# Patient Record
Sex: MALE | URBAN - METROPOLITAN AREA
[De-identification: names, ages, dates, MRNs, and addresses within clinical notes are randomized per-mention and may not be internally consistent; named-entity substitution may affect disease eponyms.]

---

## 2017-12-29 ENCOUNTER — APPOINTMENT (OUTPATIENT)
Age: 52
Setting detail: DERMATOLOGY
End: 2017-12-29

## 2017-12-29 ENCOUNTER — APPOINTMENT (RX ONLY)
Age: 52
Setting detail: DERMATOLOGY
End: 2017-12-29

## 2017-12-29 DIAGNOSIS — Z41.9 ENCOUNTER FOR PROCEDURE FOR PURPOSES OTHER THAN REMEDYING HEALTH STATE, UNSPECIFIED: ICD-10-CM

## 2017-12-29 PROCEDURE — ? PRODUCT LINE (ANTI-AGING)

## 2017-12-29 PROCEDURE — ? COSMETIC CONSULTATION - PHOTOREJUVENATION

## 2017-12-29 ASSESSMENT — LOCATION ZONE DERM
LOCATION ZONE: FACE
LOCATION ZONE: FACE

## 2017-12-29 ASSESSMENT — LOCATION DETAILED DESCRIPTION DERM
LOCATION DETAILED: LEFT CENTRAL MALAR CHEEK
LOCATION DETAILED: LEFT CENTRAL MALAR CHEEK

## 2017-12-29 ASSESSMENT — LOCATION SIMPLE DESCRIPTION DERM
LOCATION SIMPLE: LEFT CHEEK
LOCATION SIMPLE: LEFT CHEEK

## 2017-12-29 NOTE — PROCEDURE: PRODUCT LINE (ANTI-AGING)
Product 30 Application Directions: Apply a pump 2 x week at bedtime following Daily Power Defense
Name Of Product 10: Vitascrub
Product 2 Units: 0
Product 19 Application Directions: Apply to entire face BID
Product 10 Application Directions: Scrub entire face QD in the shower
Product 4 Application Directions: Exfoliate 3 x week in mornings
Product 36 Application Directions: Twice a day post Daily Power Defense
Product 13 Price (In Dollars - Numeric Only, No Special Characters Or $): 299
Name Of Product 26: RetLinksyx
Product 12 Price (In Dollars - Numeric Only, No Special Characters Or $): 49
Name Of Product 30: Upplication Travel Size
Render Product Pricing In Note: Yes
Name Of Product 22: Normalizing System
Name Of Product 15: Antiaging level 3 kit
Name Of Product 17: C Bright
Name Of Product 11: Cebatrol pads
Product 8 Application Directions: Apply QD as directed.
Product 36 Units: 1
Product 17 Price (In Dollars - Numeric Only, No Special Characters Or $): 93
Product 1 Price (In Dollars - Numeric Only, No Special Characters Or $): 120
Name Of Product 13: ZO SKIN HEALTH AGGRESSIVE ANTI-AGING LEVEL III
Product 34 Price (In Dollars - Numeric Only, No Special Characters Or $): 41.54
Product 15 Price (In Dollars - Numeric Only, No Special Characters Or $): 318.43
Product 30 Price (In Dollars - Numeric Only, No Special Characters Or $): 56.00
Product 28 Application Directions: Apply to entire face after cleansing QD
Product 24 Application Directions: Cleanse face and neck BID
Product 28 Price (In Dollars - Numeric Only, No Special Characters Or $): 51
Product 33 Application Directions: Apply to entire face QAM
Product 18 Application Directions: Apply to face and neck after washing BID
Product 24 Price (In Dollars - Numeric Only, No Special Characters Or $): 158.43
Name Of Product 7: Melamin C
Product 33 Price (In Dollars - Numeric Only, No Special Characters Or $): 93.00
Product 12 Application Directions: Cleanse face BID
Product 19 Price (In Dollars - Numeric Only, No Special Characters Or $): 93.72
Name Of Product 19: Rozatrol
Product 23 Application Directions: Apply to entire face as directed
Name Of Product 31: ZO Skin Health Level I
Product 31 Price (In Dollars - Numeric Only, No Special Characters Or $): 160
Product 21 Application Directions: Apply QAM
Product 25 Price (In Dollars - Numeric Only, No Special Characters Or $): 65
Product 35 Application Directions: Scrub face QAM or as tolerated
Product 11 Price (In Dollars - Numeric Only, No Special Characters Or $): 66.00
Product 31 Application Directions: Apply to entire face BID as directed.
Name Of Product 14: Hydrafirm
Product 11 Application Directions: Swab to entire face BID after cleansing.
Product 26 Price (In Dollars - Numeric Only, No Special Characters Or $): 154.42
Product 7 Application Directions: BID
Product 22 Price (In Dollars - Numeric Only, No Special Characters Or $): 215
Product 7 Price (In Dollars - Numeric Only, No Special Characters Or $): 125
Name Of Product 6: Daily power defense
Name Of Product 24: Clarisonic Dinora 2
Name Of Product 8: ZO Level II Antiaging kit
Name Of Product 3: Growth Factor Serum
Product 32 Application Directions: Apply to dark circles QHS
Name Of Product 9: Carissa
Product 6 Price (In Dollars - Numeric Only, No Special Characters Or $): 150
Product 8 Price (In Dollars - Numeric Only, No Special Characters Or $): 249
Product 2 Price (In Dollars - Numeric Only, No Special Characters Or $): 130
Product 27 Price (In Dollars - Numeric Only, No Special Characters Or $): 74.55
Product 10 Price (In Dollars - Numeric Only, No Special Characters Or $): 80
Name Of Product 21: Ossentials SPF Primer
Product 20 Application Directions: Apply to entire face QHS PRN
Product 14 Application Directions: Apply to periorbital region 3 x week at night
Product 14 Price (In Dollars - Numeric Only, No Special Characters Or $): 140
Name Of Product 16: Brightenex 0.5%
Name Of Product 28: TE PADS
Product 17 Application Directions: Apply QAM after cleansing
Name Of Product 27: Glycogent
Product 32 Price (In Dollars - Numeric Only, No Special Characters Or $): 0.00
Name Of Product 1: Brightenex 1%
Product 16 Price (In Dollars - Numeric Only, No Special Characters Or $): 104
Product 3 Price (In Dollars - Numeric Only, No Special Characters Or $): 148
Name Of Product 12: Cecy
Product 3 Application Directions: Apply at Bedtime
Detail Level: Zone
Product 9 Application Directions: Cleanse entire face BID, free sample given
Product 22 Application Directions: Apply to face as directed on packaging
Product 5 Application Directions: Apply QAM before makeup
Name Of Product 18: Evette
Name Of Product 25: Ossentials SPF 30 Primer
Name Of Product 4: Exfoliating polish
Name Of Product 5: Smartone SPF 50
Product 16 Application Directions: Apply to entire face QHS
Name Of Product 37: Smartone 50 SPF
Product 36 Price (In Dollars - Numeric Only, No Special Characters Or $): 88
Name Of Product 2: Ossentials Eye repair
Name Of Product 35: Vitascrub travel size
Product 2 Application Directions: Apply to periorbital region BID
Product 35 Price (In Dollars - Numeric Only, No Special Characters Or $): 50.05
Name Of Product 34: Exfoliating cleanser
Product 20 Price (In Dollars - Numeric Only, No Special Characters Or $): 145
Product 4 Price (In Dollars - Numeric Only, No Special Characters Or $): 67
Product 1 Application Directions: Apply to entire face every two nights.
Product 18 Price (In Dollars - Numeric Only, No Special Characters Or $): 39.40
Product 34 Application Directions: Wash face BID
Name Of Product 29: CytomX [employer]rapeutics SPF Travel size
Product 40 Application Directions: Apply to entire face right after cleansing and toning
Name Of Product 38: Advanced Radical Night Repair
Product 39 Application Directions: Qam
Name Of Product 49: Exfoliating cleanser travel size
Name Of Product 39: Colorescience Tan Brush SPF 50
Product 41 Price (In Dollars - Numeric Only, No Special Characters Or $): 103
Name Of Product 46: Brightenex 1% travel size
Product 49 Application Directions: Cleanse face every morning and every night
Name Of Product 40: Instant pore refiner
Product 39 Price (In Dollars - Numeric Only, No Special Characters Or $): 64
Product 42 Price (In Dollars - Numeric Only, No Special Characters Or $): 138.45
Product 41 Application Directions: Apply to face and neck once a day
Name Of Product 41: Ommerse Renewal cream
Name Of Product 47: Exfoliating [place] Travel size
Name Of Product 50: Skin care kit Level I
Product 45 Price (In Dollars - Numeric Only, No Special Characters Or $): 45
Product 43 Application Directions: Apply to entire face twice daily followed by C Mahesh
Name Of Product 43: Rubenve
Product 46 Price (In Dollars - Numeric Only, No Special Characters Or $): 56
Product 44 Price (In Dollars - Numeric Only, No Special Characters Or $): 39
Product 38 Price (In Dollars - Numeric Only, No Special Characters Or $): 170
Name Of Product 45: Sulfa Mask
Product 37 Price (In Dollars - Numeric Only, No Special Characters Or $): 74.9
Name Of Product 44: Colorscience Holiday Kit
Product 40 Price (In Dollars - Numeric Only, No Special Characters Or $): 60
Product 44 Application Directions: As directed
Product 47 Price (In Dollars - Numeric Only, No Special Characters Or $): 18
Product 38 Application Directions: Every night as tolerated
Product 43 Price (In Dollars - Numeric Only, No Special Characters Or $): 120.00
Name Of Product 42: Intensive eye repair
Product 49 Price (In Dollars - Numeric Only, No Special Characters Or $): 17

## 2022-01-12 NOTE — PROCEDURE: PRODUCT LINE (ANTI-AGING)
Product 21 Application Directions: Apply QAM
Product 34 Application Directions: Wash face BID
Product 71 Price (In Dollars - Numeric Only, No Special Characters Or $): 0.00
Product 46 Price (In Dollars - Numeric Only, No Special Characters Or $): 56
Product 34 Units: 0
Product 12 Application Directions: Cleanse face BID
Product 41 Application Directions: Apply to face and neck once a day
Product 40 Price (In Dollars - Numeric Only, No Special Characters Or $): 60
Product 10 Application Directions: Scrub entire face QD in the shower
Product 23 Price (In Dollars - Numeric Only, No Special Characters Or $): 215
Name Of Product 14: Hydrafirm
Name Of Product 17: C Bright
Name Of Product 49: Exfoliating cleanser travel size
(2) Patient Placed in Bed
Product 26 Price (In Dollars - Numeric Only, No Special Characters Or $): 154.42
Product 12 Price (In Dollars - Numeric Only, No Special Characters Or $): 49
Product 15 Price (In Dollars - Numeric Only, No Special Characters Or $): 318.43
Product 4 Price (In Dollars - Numeric Only, No Special Characters Or $): 67
Name Of Product 35: Vitascrub travel size
Product 14 Price (In Dollars - Numeric Only, No Special Characters Or $): 140
Product 45 Price (In Dollars - Numeric Only, No Special Characters Or $): 45
Product 25 Application Directions: Apply to entire face QAM
Product 16 Price (In Dollars - Numeric Only, No Special Characters Or $): 104
Product 38 Application Directions: Every night as tolerated
Product 30 Application Directions: Apply a pump 2 x week at bedtime following Daily Power Defense
Name Of Product 40: Instant pore refiner
Name Of Product 29: Unbound SPF Travel size
Product 13 Application Directions: Apply to entire face as directed
Product 28 Price (In Dollars - Numeric Only, No Special Characters Or $): 51
Name Of Product 34: Exfoliating cleanser
Product 16 Application Directions: Apply to entire face QHS
Product 48 Price (In Dollars - Numeric Only, No Special Characters Or $): 120
Product 36 Application Directions: Twice a day post Daily Power Defense
Product 5 Application Directions: Apply QAM before makeup
Name Of Product 41: Ommerse Renewal cream
Product 6 Application Directions: Apply to entire face BID
Detail Level: Zone
Name Of Product 22: Normalizing System
Name Of Product 39: Colorescience Tan Brush SPF 50
Product 11 Application Directions: Swab to entire face BID after cleansing.
Product 35 Application Directions: Scrub face QAM or as tolerated
Product 40 Application Directions: Apply to entire face right after cleansing and toning
Name Of Product 4: Exfoliating polish
Name Of Product 43: Sincereve
Name Of Product 20: RetVeaconx
Name Of Product 42: Intensive eye repair
Name Of Product 37: Smartone 50 SPF
Name Of Product 19: Rozatrol
Product 17 Application Directions: Apply QAM after cleansing
Product 20 Price (In Dollars - Numeric Only, No Special Characters Or $): 145
Product 32 Application Directions: Apply to dark circles QHS
Product 3 Application Directions: Apply at Bedtime
Product 13 Price (In Dollars - Numeric Only, No Special Characters Or $): 299
Name Of Product 25: Ossentials SPF 30 Primer
Product 31 Price (In Dollars - Numeric Only, No Special Characters Or $): 160
Name Of Product 13: ZO SKIN HEALTH AGGRESSIVE ANTI-AGING LEVEL III
Product 18 Price (In Dollars - Numeric Only, No Special Characters Or $): 39.40
Product 38 Price (In Dollars - Numeric Only, No Special Characters Or $): 170
Name Of Product 7: Melamin C
Product 18 Application Directions: Apply to face and neck after washing BID
Product 27 Price (In Dollars - Numeric Only, No Special Characters Or $): 74.55
Product 47 Price (In Dollars - Numeric Only, No Special Characters Or $): 18
Product 11 Price (In Dollars - Numeric Only, No Special Characters Or $): 66.00
Product 8 Application Directions: Apply QD as directed.
Name Of Product 38: Advanced Radical Night Repair
Render Product Pricing In Note: Yes
Product 28 Application Directions: Apply to entire face after cleansing QD
Product 50 Units: 1
Name Of Product 31: ZO Skin Health Level I
Product 22 Application Directions: Apply to face as directed on packaging
Name Of Product 12: Satish
Name Of Product 50: Skin care kit Level I
Name Of Product 9: Isra
Name Of Product 46: Brightenex 1% travel size
Product 7 Price (In Dollars - Numeric Only, No Special Characters Or $): 125
Name Of Product 30: Wellogix Travel Size
Product 30 Price (In Dollars - Numeric Only, No Special Characters Or $): 56.00
Product 50 Application Directions: As directed
Name Of Product 2: Ossentials Eye repair
Name Of Product 16: Brightenex 0.5%
Product 9 Application Directions: Cleanse entire face BID, free sample given
Product 6 Price (In Dollars - Numeric Only, No Special Characters Or $): 150
Product 3 Price (In Dollars - Numeric Only, No Special Characters Or $): 148
Name Of Product 18: Gyoo
Product 49 Application Directions: Cleanse face every morning and every night
Product 25 Price (In Dollars - Numeric Only, No Special Characters Or $): 65
Product 43 Price (In Dollars - Numeric Only, No Special Characters Or $): 120.00
Name Of Product 21: Ossentials SPF Primer
Product 19 Price (In Dollars - Numeric Only, No Special Characters Or $): 93.72
Name Of Product 27: Glycogent
Product 35 Price (In Dollars - Numeric Only, No Special Characters Or $): 50.05
Name Of Product 44: Colorscience Holiday Kit
Product 4 Application Directions: Exfoliate 3 x week in mornings
Name Of Product 3: Growth Factor Serum
Name Of Product 15: Antiaging level 3 kit
Product 7 Application Directions: BID
Product 34 Price (In Dollars - Numeric Only, No Special Characters Or $): 41.54
Product 14 Application Directions: Apply to periorbital region 3 x week at night
Name Of Product 1: Brightenex 1%
Product 8 Price (In Dollars - Numeric Only, No Special Characters Or $): 249
Name Of Product 10: Vitascrub
Product 39 Price (In Dollars - Numeric Only, No Special Characters Or $): 64
Name Of Product 6: Daily power defense
Product 17 Price (In Dollars - Numeric Only, No Special Characters Or $): 93
Product 20 Application Directions: Apply to entire face QHS PRN
Product 1 Application Directions: Apply to entire face every two nights.
Name Of Product 45: Sulfa Mask
Product 43 Application Directions: Apply to entire face twice daily followed by C James
Product 24 Price (In Dollars - Numeric Only, No Special Characters Or $): 158.62
Product 2 Price (In Dollars - Numeric Only, No Special Characters Or $): 130
Product 37 Application Directions: Qam
Name Of Product 24: Clarisonic Ebonie 2
Name Of Product 5: Smartone SPF 50
Product 24 Application Directions: Cleanse face and neck BID
Product 49 Price (In Dollars - Numeric Only, No Special Characters Or $): 17
Product 36 Price (In Dollars - Numeric Only, No Special Characters Or $): 88
Name Of Product 28: TE PADS
Name Of Product 47: Exfoliating Polish Travel size
Product 37 Price (In Dollars - Numeric Only, No Special Characters Or $): 74.9
Product 41 Price (In Dollars - Numeric Only, No Special Characters Or $): 103
Product 44 Price (In Dollars - Numeric Only, No Special Characters Or $): 39
Product 10 Price (In Dollars - Numeric Only, No Special Characters Or $): 80
Product 2 Application Directions: Apply to periorbital region BID
Product 33 Price (In Dollars - Numeric Only, No Special Characters Or $): 93.00
Product 42 Price (In Dollars - Numeric Only, No Special Characters Or $): 138.45
Name Of Product 8: ZO Level II Antiaging kit
Name Of Product 11: Cebatrol pads
Product 31 Application Directions: Apply to entire face BID as directed.